# Patient Record
Sex: FEMALE | Race: WHITE | NOT HISPANIC OR LATINO | Employment: OTHER | URBAN - METROPOLITAN AREA
[De-identification: names, ages, dates, MRNs, and addresses within clinical notes are randomized per-mention and may not be internally consistent; named-entity substitution may affect disease eponyms.]

---

## 2020-08-14 PROBLEM — G60.0 CHARCOT-MARIE-TOOTH DISEASE: Status: ACTIVE | Noted: 2020-08-14

## 2024-10-20 ENCOUNTER — NURSE TRIAGE (OUTPATIENT)
Dept: ADMINISTRATIVE | Facility: CLINIC | Age: 29
End: 2024-10-20

## 2024-10-20 NOTE — TELEPHONE ENCOUNTER
Has muscle condition, CMT  Hip to knee, L side, swollen, burning and pain   Unable to sit up       Advised pt to call 911 now. Pt confirms that she can call 911.        Reason for Disposition   Sounds like a life-threatening emergency to the triager    Additional Information   Negative: SEVERE difficulty breathing (e.g., struggling for each breath, speaks in single words)   Negative: Looks like a broken bone or dislocated joint (e.g., crooked or deformed)    Protocols used: Leg Swelling and Edema-A-AH